# Patient Record
Sex: FEMALE | Race: BLACK OR AFRICAN AMERICAN
[De-identification: names, ages, dates, MRNs, and addresses within clinical notes are randomized per-mention and may not be internally consistent; named-entity substitution may affect disease eponyms.]

---

## 2024-09-27 ENCOUNTER — NURSE TRIAGE (OUTPATIENT)
Dept: OTHER | Facility: CLINIC | Age: 59
End: 2024-09-27

## 2024-11-07 ENCOUNTER — HOSPITAL ENCOUNTER (OUTPATIENT)
Facility: HOSPITAL | Age: 59
Setting detail: RECURRING SERIES
Discharge: HOME | End: 2024-11-10
Payer: COMMERCIAL

## 2024-11-07 PROCEDURE — 97110 THERAPEUTIC EXERCISES: CPT

## 2024-11-07 PROCEDURE — 97162 PT EVAL MOD COMPLEX 30 MIN: CPT

## 2024-11-07 NOTE — THERAPY EVALUATION
Overall Complexity Rating: {PT OP Complexity:2893834948}  Problem List: {[facility] PT PROBLEM LIST:62434}   Treatment Plan may include any combination of the following: {[facility] POC Tx Plan:64149:a}  Patient / Family readiness to learn indicated by: {Outpt PT Patient Family Readiness to Learn:52376}  Persons(s) to be included in education: {[facility] PT PERSONS EDUCATION:24956}  Barriers to Learning/Limitations: {[facility] PT Barriers to Learning List:70588}  Measures taken if barriers to learning present: ***  Patient Self Reported Health Status: {Outpt PT rehab Potential:99010}  Rehabilitation Potential: {Outpt PT rehab Potential:78055}    Short Term Goals: To be accomplished in *** treatments.  ***  Long Term Goals: To be accomplished in *** treatments.  ***    Frequency / Duration: Patient to be seen *** times per week for *** treatments.    Patient/ Caregiver education and instruction: Diagnosis, prognosis, {Outpt PT patient caregiver ed.:17366}   [x]  Plan of care has been reviewed with PTA          Lusi Nunez, PT       11/7/2024       9:23 AM        ===================================================================  I certify that the above Therapy Services are being furnished while the patient is under my care. I agree with the treatment plan and certify that this therapy is necessary.    Physician's Signature:_________________________   DATE:_________   TIME:________                           Jorge Aguila MD    ** Signature, Date and Time must be completed for valid certification **  Please sign and fax to [phone].  Thank you

## 2024-11-12 ENCOUNTER — HOSPITAL ENCOUNTER (OUTPATIENT)
Facility: HOSPITAL | Age: 59
Setting detail: RECURRING SERIES
Discharge: HOME | End: 2024-11-15
Payer: COMMERCIAL

## 2024-11-12 PROCEDURE — 97110 THERAPEUTIC EXERCISES: CPT

## 2024-11-12 PROCEDURE — 97140 MANUAL THERAPY 1/> REGIONS: CPT

## 2024-11-17 ENCOUNTER — HOSPITAL ENCOUNTER (OUTPATIENT)
Facility: HOSPITAL | Age: 59
Discharge: HOME | End: 2024-11-20

## 2024-11-17 DIAGNOSIS — M25.511 ACUTE PAIN OF RIGHT SHOULDER: ICD-10-CM

## 2024-11-17 DIAGNOSIS — S46.011A TRAUMATIC TEAR OF RIGHT ROTATOR CUFF, UNSPECIFIED TEAR EXTENT, INITIAL ENCOUNTER: ICD-10-CM

## 2024-11-17 DIAGNOSIS — M25.511 PERISCAPULAR PAIN OF RIGHT SHOULDER: ICD-10-CM

## 2024-11-21 ENCOUNTER — TRANSCRIBE ORDERS (OUTPATIENT)
Facility: HOSPITAL | Age: 59
End: 2024-11-21

## 2024-11-21 ENCOUNTER — HOSPITAL ENCOUNTER (OUTPATIENT)
Facility: HOSPITAL | Age: 59
Setting detail: RECURRING SERIES
Discharge: HOME | End: 2024-11-24
Payer: COMMERCIAL

## 2024-11-21 DIAGNOSIS — M25.511 ACUTE PAIN OF RIGHT SHOULDER: ICD-10-CM

## 2024-11-21 DIAGNOSIS — M25.511 PERISCAPULAR PAIN OF RIGHT SHOULDER: ICD-10-CM

## 2024-11-21 DIAGNOSIS — S46.011A TRAUMATIC TEAR OF RIGHT ROTATOR CUFF, UNSPECIFIED TEAR EXTENT, INITIAL ENCOUNTER: Primary | ICD-10-CM

## 2024-11-21 PROCEDURE — 97110 THERAPEUTIC EXERCISES: CPT

## 2024-11-21 NOTE — PROGRESS NOTES
Patient Education billed concurrently with other procedures   [x] Review HEP    [] Progressed/Changed HEP, detail:    [] Other detail:         Other Objective/Functional Measures  NA    Pain Level at end of session (0-10 scale): 0-1      Assessment   Pt is progressing well and tolerating all activity.  She is getting stronger.  Will need to continue to progress strength and work toward increasing resistance.    Patient will continue to benefit from skilled PT / OT services to modify and progress therapeutic interventions, analyze and address functional mobility deficits, analyze and address ROM deficits, analyze and address strength deficits, analyze and address soft tissue restrictions, analyze and cue for proper movement patterns, analyze and modify for postural abnormalities, analyze and address imbalance/dizziness, and instruct in home and community integration to address functional deficits and attain remaining goals.    Progress toward goals / Updated goals:  []  See Progress Note/Recertification    Progressing toward goals      PLAN  Yes  Continue plan of care  Re-Cert Due: NA  [x]  Upgrade activities as tolerated  []  Discharge due to:  []  Other:      Luis Nunez, PT       11/21/24         7:43 AM

## 2024-11-25 ENCOUNTER — APPOINTMENT (OUTPATIENT)
Facility: HOSPITAL | Age: 59
End: 2024-11-25
Payer: COMMERCIAL

## 2024-11-26 ENCOUNTER — HOSPITAL ENCOUNTER (OUTPATIENT)
Facility: HOSPITAL | Age: 59
Setting detail: RECURRING SERIES
Discharge: HOME | End: 2024-11-29
Payer: COMMERCIAL

## 2024-11-26 PROCEDURE — 97110 THERAPEUTIC EXERCISES: CPT

## 2024-11-26 NOTE — PROGRESS NOTES
[facility] Physical Therapy  [address]  [place] [address]  Phone: [phone]   Fax: [phone]     PHYSICAL THERAPY PROGRESS NOTE  Patient Name:  Shaina West :  1965   Treatment/Medical Diagnosis: Pain in right shoulder [M25.511]   Referral Source:  Jorge Aguila MD     Date of Initial Visit:  24 Attended Visits:  4 Missed Visits:  2     SUMMARY OF TREATMENT/ASSESSMENT:  PT has completed 4 sessions of therapy and is progressing well.  She has shown increased shoulder strength and continued to present with full AROM. She feels that day to day activity around her home is easier as well.  She does still present with some weakness that is improving with her HEP.  Will continue to progress as able and await resutls of the arthrogram.      CURRENT STATUS/GOALS:    UPPER QUARTER                             MUSCLE STRENGTH  KEY                                                                             R                      L  0 - No Contraction                               Flexion             4                      5  1 - Trace                                              Extension        NT                   NT  2 - Poor                                               Abduction        4                      4  3 - Fair                                                 IR                     4                      5  4 - Good                                              ER                   4                      4  5 - Normal                                   AROM is WFL    Short Term Goals: To be accomplished in 8-10 treatments.  Pt will be consistent and demonstrate I with HEP MET  Pt will complain of pain 1-2/10 with all activity MEt  Pt will demonstrate improved ROM to complete all ADL's and household chores more efficiently MET  Pt will be able to maintain positions for 45min without increased symptoms MEt     Long 
Measures  UPPER QUARTER                             MUSCLE STRENGTH  KEY                                                                             R                      L  0 - No Contraction                               Flexion             4                      5  1 - Trace                                              Extension        NT                   NT  2 - Poor                                               Abduction        4                      4  3 - Fair                                                 IR                     4                      5  4 - Good                                              ER                   4                      4  5 - Normal                                   Pain Level at end of session (0-10 scale): 0      Assessment   Pt is progressing well and demonstrates increased strength.  ROM has remained WFL.  She has her arthrogram 12/5 will find out more about her return to work based on those findings.      Patient will continue to benefit from skilled PT / OT services to modify and progress therapeutic interventions, analyze and address functional mobility deficits, analyze and address ROM deficits, analyze and address strength deficits, analyze and address soft tissue restrictions, analyze and cue for proper movement patterns, analyze and modify for postural abnormalities, analyze and address imbalance/dizziness, and instruct in home and community integration to address functional deficits and attain remaining goals.    Progress toward goals / Updated goals:  []  See Progress Note/Recertification    Progressing toward goals      PLAN  Yes  Continue plan of care  Re-Cert Due: NA  [x]  Upgrade activities as tolerated  []  Discharge due to:  []  Other:      Luis Nunez, PT       11/26/24         10:30 AM

## 2024-12-05 ENCOUNTER — HOSPITAL ENCOUNTER (OUTPATIENT)
Facility: HOSPITAL | Age: 59
Discharge: HOME | End: 2024-12-08
Payer: COMMERCIAL

## 2024-12-05 DIAGNOSIS — M25.511 ACUTE PAIN OF RIGHT SHOULDER: ICD-10-CM

## 2024-12-05 DIAGNOSIS — M25.511 PERISCAPULAR PAIN OF RIGHT SHOULDER: ICD-10-CM

## 2024-12-05 DIAGNOSIS — S46.011A TRAUMATIC TEAR OF RIGHT ROTATOR CUFF, UNSPECIFIED TEAR EXTENT, INITIAL ENCOUNTER: ICD-10-CM

## 2024-12-05 PROCEDURE — 6360000002 HC RX W HCPCS

## 2024-12-05 PROCEDURE — 73201 CT UPPER EXTREMITY W/DYE: CPT

## 2024-12-05 PROCEDURE — 77002 NEEDLE LOCALIZATION BY XRAY: CPT

## 2024-12-05 PROCEDURE — 6360000004 HC RX CONTRAST MEDICATION

## 2024-12-05 RX ORDER — LIDOCAINE HYDROCHLORIDE 10 MG/ML
5 INJECTION, SOLUTION EPIDURAL; INFILTRATION; INTRACAUDAL; PERINEURAL ONCE
Status: COMPLETED | OUTPATIENT
Start: 2024-12-05 | End: 2024-12-05

## 2024-12-05 RX ADMIN — IOHEXOL 15 ML: 180 INJECTION INTRAVENOUS at 12:22

## 2024-12-05 RX ADMIN — LIDOCAINE HYDROCHLORIDE 5 ML: 10 INJECTION, SOLUTION EPIDURAL; INFILTRATION; INTRACAUDAL; PERINEURAL at 12:17
